# Patient Record
Sex: FEMALE | Race: WHITE | NOT HISPANIC OR LATINO | URBAN - METROPOLITAN AREA
[De-identification: names, ages, dates, MRNs, and addresses within clinical notes are randomized per-mention and may not be internally consistent; named-entity substitution may affect disease eponyms.]

---

## 2017-11-13 ENCOUNTER — IMPORTED ENCOUNTER (OUTPATIENT)
Dept: URBAN - METROPOLITAN AREA CLINIC 50 | Facility: CLINIC | Age: 77
End: 2017-11-13

## 2017-11-17 ENCOUNTER — IMPORTED ENCOUNTER (OUTPATIENT)
Dept: URBAN - METROPOLITAN AREA CLINIC 50 | Facility: CLINIC | Age: 77
End: 2017-11-17

## 2018-11-16 ENCOUNTER — IMPORTED ENCOUNTER (OUTPATIENT)
Dept: URBAN - METROPOLITAN AREA CLINIC 50 | Facility: CLINIC | Age: 78
End: 2018-11-16

## 2019-01-03 ENCOUNTER — IMPORTED ENCOUNTER (OUTPATIENT)
Dept: URBAN - METROPOLITAN AREA CLINIC 50 | Facility: CLINIC | Age: 79
End: 2019-01-03

## 2019-04-22 ENCOUNTER — IMPORTED ENCOUNTER (OUTPATIENT)
Dept: URBAN - METROPOLITAN AREA CLINIC 50 | Facility: CLINIC | Age: 79
End: 2019-04-22

## 2019-05-24 ENCOUNTER — IMPORTED ENCOUNTER (OUTPATIENT)
Dept: URBAN - METROPOLITAN AREA CLINIC 50 | Facility: CLINIC | Age: 79
End: 2019-05-24

## 2020-01-10 ENCOUNTER — IMPORTED ENCOUNTER (OUTPATIENT)
Dept: URBAN - METROPOLITAN AREA CLINIC 50 | Facility: CLINIC | Age: 80
End: 2020-01-10

## 2020-06-05 ENCOUNTER — IMPORTED ENCOUNTER (OUTPATIENT)
Dept: URBAN - METROPOLITAN AREA CLINIC 50 | Facility: CLINIC | Age: 80
End: 2020-06-05

## 2021-01-15 ENCOUNTER — IMPORTED ENCOUNTER (OUTPATIENT)
Dept: URBAN - METROPOLITAN AREA CLINIC 50 | Facility: CLINIC | Age: 81
End: 2021-01-15

## 2021-01-15 NOTE — PATIENT DISCUSSION
"""Continue Artificial tears both eyes twice a day ."" ""Continue TobraDex Ointment both eyes as needed . "" ""Continue Warm compresses both eyes as needed . """

## 2021-04-17 ASSESSMENT — VISUAL ACUITY
OD_CC: J1+@ 16 IN
OD_OTHER: 20/25. 20/50.
OS_SC: 20/30+2
OD_SC: 20/20-2
OS_CC: J1+@ 16 IN
OD_SC: 20/20-2
OD_SC: 20/25-2
OD_BAT: 20/25
OS_BAT: 20/50
OD_CC: J1+
OS_BAT: 20/30
OS_CC: J1+
OS_CC: J1
OS_SC: 20/25-2
OS_SC: 20/30-1
OD_BAT: 20/70
OD_CC: J1+
OD_PH: @ 16 IN
OS_OTHER: 20/30. 20/60.
OD_CC: J1+@ 16 IN
OS_CC: J1
OD_OTHER: 20/70. 20/100.
OS_SC: 20/25
OS_PH: 20/25-2
OD_SC: 20/20-1
OS_SC: 20/25-2
OD_SC: 20/30
OS_OTHER: 20/50. 20/80.
OS_SC: 20/25-1
OD_SC: 20/25-1
OS_PH: @ 16 IN
OS_CC: J1+@ 16 IN
OD_CC: J1

## 2021-04-17 ASSESSMENT — TONOMETRY
OS_IOP_MMHG: 16
OS_IOP_MMHG: 15
OD_IOP_MMHG: 16
OS_IOP_MMHG: 15
OD_IOP_MMHG: 16
OS_IOP_MMHG: 16
OD_IOP_MMHG: 16
OD_IOP_MMHG: 15
OS_IOP_MMHG: 15
OD_IOP_MMHG: 16
OD_IOP_MMHG: 15
OS_IOP_MMHG: 15

## 2021-11-09 NOTE — PATIENT DISCUSSION
The patient was informed that with [address] [facility] for distance, they will need glasses for all near and intermediate activities after surgery. The patient understands there is a possibility they may need an enhancement after surgery. The patient elects Custom Vision OD, goal of emmetropia.

## 2021-11-09 NOTE — PATIENT DISCUSSION
Patient has had contact lenses out for 2 weeks. Advised to keep lenses out as much as possible prior to surgery.

## 2021-12-13 NOTE — PATIENT DISCUSSION
The patient was informed that with [address] [facility] for distance, they will need glasses for all near and intermediate activities after surgery. The patient understands there is a possibility they may need an enhancement after surgery. The patient elects Custom Vision OD, goal of emmetropia.

## 2021-12-13 NOTE — PATIENT DISCUSSION
The patient feels that the cataract is significantly impacting daily activities and has elected cataract surgery. The risks, benefits, and alternatives to surgery were discussed. The patient elects to proceed with surgery. None

## 2022-01-11 NOTE — PATIENT DISCUSSION
Recommend 1cc's of RHA2  (discussed risks and benefits. ..)  Lot #: [id number]  Expiration date: 03/05/2024.

## 2022-01-11 NOTE — PROCEDURE NOTE: CLINICAL
PROCEDURE NOTE: Revance Resilient Hyaluronic Acid 2 Lips. Diagnosis: Rhytids, Fine Lip Lines. Prior to the treatment, the risks/benefits/alternatives were discussed. The patient wished to proceed with the procedure. Lot #: [id number]  Expiration date: 03/05/2024  Total ml used: 1 . Refer to template for location and number of injections. Patient tolerated the procedure well. There were no complications, post procedure instructions were given. Colton Faith

## 2022-01-11 NOTE — PATIENT DISCUSSION
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 ALso, patient has appt with Dov Gravel tomorrow, so recommend no filler injections in face 2 weeks pre/post  services.

## 2022-01-12 ENCOUNTER — PREPPED CHART (OUTPATIENT)
Dept: URBAN - METROPOLITAN AREA CLINIC 50 | Facility: CLINIC | Age: 82
End: 2022-01-12

## 2022-01-12 NOTE — PATIENT DISCUSSION
"""Continue Artificial tears both eyes twice a day ."" ""Continue TobraDex Ointment both eyes as needed . "" ""Continue Warm compresses both eyes as needed . "". "

## 2022-01-21 ENCOUNTER — COMPREHENSIVE EXAM (OUTPATIENT)
Dept: URBAN - METROPOLITAN AREA CLINIC 50 | Facility: CLINIC | Age: 82
End: 2022-01-21

## 2022-01-21 DIAGNOSIS — H43.813: ICD-10-CM

## 2022-01-21 DIAGNOSIS — H26.493: ICD-10-CM

## 2022-01-21 PROCEDURE — 92134 CPTRZ OPH DX IMG PST SGM RTA: CPT

## 2022-01-21 PROCEDURE — 92015 DETERMINE REFRACTIVE STATE: CPT

## 2022-01-21 PROCEDURE — 92014 COMPRE OPH EXAM EST PT 1/>: CPT

## 2022-01-21 ASSESSMENT — VISUAL ACUITY
OU_SC: 20/20-2
OD_GLARE: 20/25
OD_GLARE: 20/50
OS_GLARE: 20/25
OS_PH: 20/25
OS_SC: 20/30
OS_GLARE: 20/50
OD_SC: 20/20-1
OU_CC: J1+

## 2022-01-21 ASSESSMENT — TONOMETRY
OD_IOP_MMHG: 16
OS_IOP_MMHG: 16

## 2023-01-27 ENCOUNTER — COMPREHENSIVE EXAM (OUTPATIENT)
Dept: URBAN - METROPOLITAN AREA CLINIC 50 | Facility: CLINIC | Age: 83
End: 2023-01-27

## 2023-01-27 DIAGNOSIS — H43.813: ICD-10-CM

## 2023-01-27 DIAGNOSIS — H11.033: ICD-10-CM

## 2023-01-27 DIAGNOSIS — H26.493: ICD-10-CM

## 2023-01-27 DIAGNOSIS — H02.831: ICD-10-CM

## 2023-01-27 DIAGNOSIS — H02.834: ICD-10-CM

## 2023-01-27 DIAGNOSIS — H04.123: ICD-10-CM

## 2023-01-27 PROCEDURE — 92014 COMPRE OPH EXAM EST PT 1/>: CPT

## 2023-01-27 PROCEDURE — 92015 DETERMINE REFRACTIVE STATE: CPT

## 2023-01-27 ASSESSMENT — TONOMETRY
OD_IOP_MMHG: 18
OS_IOP_MMHG: 16

## 2023-01-27 ASSESSMENT — VISUAL ACUITY
OS_GLARE: 20/25
OD_SC: 20/25-1
OS_GLARE: 20/30
OS_SC: 20/40+1
OS_PH: 20/25
OD_GLARE: 20/40
OU_SC: 20/25-1
OD_GLARE: 20/30
OU_CC: J1+@14"

## 2023-01-27 NOTE — PATIENT DISCUSSION
Will reassess in 6 months for possible laser. Patient declined glasses Rx today. Will wait to have Yag Cap.

## 2023-01-31 NOTE — PROCEDURE NOTE: CLINICAL
PROCEDURE NOTE: Revance Resilient Hyaluronic Acid 2 Lips. Diagnosis: Rhytids, Thinning Lips. Prior to the treatment, the risks/benefits/alternatives were discussed. The patient wished to proceed with the procedure. Lot #: [id number]. Expiration date: 2/21/2025. Total ml used: 1. Refer to template for location and number of injections. Patient tolerated the procedure well. There were no complications, post procedure instructions were given. Loi Young

## 2023-01-31 NOTE — PATIENT DISCUSSION
Recommend 1cc's of RHA2 (discussed risks and benefits. ..).  Lot #: [id number]. Expiration date: 2/21/2025.

## 2023-08-25 ENCOUNTER — COMPREHENSIVE EXAM (OUTPATIENT)
Dept: URBAN - METROPOLITAN AREA CLINIC 50 | Facility: LOCATION | Age: 83
End: 2023-08-25

## 2023-08-25 DIAGNOSIS — H11.033: ICD-10-CM

## 2023-08-25 DIAGNOSIS — H02.831: ICD-10-CM

## 2023-08-25 DIAGNOSIS — H43.813: ICD-10-CM

## 2023-08-25 DIAGNOSIS — H04.123: ICD-10-CM

## 2023-08-25 DIAGNOSIS — H26.493: ICD-10-CM

## 2023-08-25 DIAGNOSIS — H02.834: ICD-10-CM

## 2023-08-25 PROCEDURE — 99214 OFFICE O/P EST MOD 30 MIN: CPT

## 2023-08-25 PROCEDURE — 66821 AFTER CATARACT LASER SURGERY: CPT

## 2023-08-25 ASSESSMENT — TONOMETRY
OD_IOP_MMHG: 14
OD_IOP_MMHG: 13
OS_IOP_MMHG: 11
OS_IOP_MMHG: 12

## 2023-08-25 ASSESSMENT — VISUAL ACUITY
OS_SC: 20/40
OD_GLARE: 20/30
OS_PH: 20/25
OS_GLARE: 20/30
OD_SC: 20/20
OD_GLARE: 20/25
OS_GLARE: 20/40

## 2024-06-28 ENCOUNTER — COMPREHENSIVE EXAM (OUTPATIENT)
Dept: URBAN - METROPOLITAN AREA CLINIC 50 | Facility: LOCATION | Age: 84
End: 2024-06-28

## 2024-06-28 DIAGNOSIS — H43.813: ICD-10-CM

## 2024-06-28 DIAGNOSIS — H02.831: ICD-10-CM

## 2024-06-28 DIAGNOSIS — H11.033: ICD-10-CM

## 2024-06-28 DIAGNOSIS — H04.123: ICD-10-CM

## 2024-06-28 DIAGNOSIS — Z98.890: ICD-10-CM

## 2024-06-28 DIAGNOSIS — H02.834: ICD-10-CM

## 2024-06-28 DIAGNOSIS — H26.491: ICD-10-CM

## 2024-06-28 PROCEDURE — 99214 OFFICE O/P EST MOD 30 MIN: CPT

## 2024-06-28 ASSESSMENT — TONOMETRY
OS_IOP_MMHG: 20
OD_IOP_MMHG: 20

## 2024-06-28 ASSESSMENT — VISUAL ACUITY: OD_SC: 20/25

## 2025-07-11 ENCOUNTER — COMPREHENSIVE EXAM (OUTPATIENT)
Age: 85
End: 2025-07-11

## 2025-07-11 DIAGNOSIS — H26.491: ICD-10-CM

## 2025-07-11 DIAGNOSIS — H02.834: ICD-10-CM

## 2025-07-11 DIAGNOSIS — H04.123: ICD-10-CM

## 2025-07-11 DIAGNOSIS — H52.4: ICD-10-CM

## 2025-07-11 DIAGNOSIS — H11.033: ICD-10-CM

## 2025-07-11 DIAGNOSIS — H02.831: ICD-10-CM

## 2025-07-11 DIAGNOSIS — H43.813: ICD-10-CM

## 2025-07-11 PROCEDURE — 99214 OFFICE O/P EST MOD 30 MIN: CPT
